# Patient Record
Sex: FEMALE | Race: WHITE
[De-identification: names, ages, dates, MRNs, and addresses within clinical notes are randomized per-mention and may not be internally consistent; named-entity substitution may affect disease eponyms.]

---

## 2017-02-24 ENCOUNTER — HOSPITAL ENCOUNTER (EMERGENCY)
Dept: HOSPITAL 25 - UCEAST | Age: 47
Discharge: HOME | End: 2017-02-24
Payer: COMMERCIAL

## 2017-02-24 VITALS — SYSTOLIC BLOOD PRESSURE: 113 MMHG | DIASTOLIC BLOOD PRESSURE: 73 MMHG

## 2017-02-24 DIAGNOSIS — R10.30: Primary | ICD-10-CM

## 2017-02-24 DIAGNOSIS — Z88.2: ICD-10-CM

## 2017-02-24 DIAGNOSIS — N94.89: ICD-10-CM

## 2017-02-24 DIAGNOSIS — Z88.0: ICD-10-CM

## 2017-02-24 DIAGNOSIS — F41.9: ICD-10-CM

## 2017-02-24 PROCEDURE — 74176 CT ABD & PELVIS W/O CONTRAST: CPT

## 2017-02-24 PROCEDURE — G0463 HOSPITAL OUTPT CLINIC VISIT: HCPCS

## 2017-02-24 PROCEDURE — 81002 URINALYSIS NONAUTO W/O SCOPE: CPT

## 2017-02-24 PROCEDURE — 99212 OFFICE O/P EST SF 10 MIN: CPT

## 2017-02-24 NOTE — UC
Abdominal Pain Female HPI





- HPI Summary


HPI Summary: 


WENT TO [facility] 3 DAYS AGO AND TX FOR UTI WITH MACROBID BID X 5 DAYS. IS 

FEELING WORSE. PERSISTENT SUPRAPUBIC PAIN GOING INTO GROIN AND FLANK (RIGHT>LEFT

), FEELS BLOATED AND NAUSEATED. NO FEVER. HAS URINARY FREQUENCY AND MILD 

DYSURIA.





- History of Current Complaint


Chief Complaint: UCAbdominalPain


Stated Complaint: ABD PAIN


Time Seen by Provider: 02/24/17 07:40


Hx Obtained From: Patient


Hx Last Menstrual Period: 2/2016 Uterine ablation 2/16


Onset/Duration: Gradual Onset, Lasting Days, Still Present


Timing: Constant


Severity Initially: Moderate


Severity Currently: Moderate


Pain Intensity: 6


Pain Scale Used: 0-10 Numeric


Location: Suprapubic


Radiates: Yes


Radiates to: Flank


Character: Burning, Sharp


Aggravating Factor(s): Nothing


Alleviating Factor(s): Nothing


Associated Signs and Symptoms: Positive: Back Pain, Urinary Symptoms, Nausea.  

Negative: Diaphoresis, Fever, Cough, Chest Pain, Dizzy, Constipation, Blood in 

Stool, Decreased Appetite, Vaginal Bleeding, Vaginal Discharge, Vomiting, 

Diarrhea


Allergies/Adverse Reactions: 


 Allergies











Allergy/AdvReac Type Severity Reaction Status Date / Time


 


Amoxicillin Allergy Severe Rash Verified 02/18/16 08:15


 


Penicillins Allergy Severe Shortness Verified 02/18/16 08:15





   of Breath  


 


Sulfa Antibiotics Allergy Severe Rash Verified 02/18/16 08:15











Home Medications: 


 Home Medications





Nitrofurantoin Monohyd Macro [Macrobid] 1 cap PO BID 02/24/17 [History 

Confirmed 02/24/17]











PMH/Surg Hx/FS Hx/Imm Hx


Psychological History Of: Reports: Anxiety - ON MEDS


Cancer History Of: 


   Denies: Breast Cancer





- Surgical History


Surgical History: Yes


Surgery Procedure, Year, and Place: GALLBLADDER, 17 YRS AGO, [facility].  TONSILECTOMY

, CHILD.  Uterine Ablation





- Family History


Known Family History: Positive: Diabetes


Family History: DIABETES - DAD, RHEUMATOID ARTHRITIS - MOM





- Social History


Alcohol Use: None


Substance Use Type: None


Smoking Status (MU): Never Smoked Tobacco


Have You Smoked in the Last Year: No





Review of Systems


Constitutional: Negative


Respiratory: Negative


Cardiovascular: Negative


Gastrointestinal: Abdominal Pain, Other - NAUSEA


Genitourinary: Dysuria, Frequency


All Other Systems Reviewed And Are Negative: Yes





Physical Exam


Triage Information Reviewed: Yes


Appearance: Well-Appearing, No Pain Distress, Well-Nourished


Vital Signs: 


 Initial Vital Signs











Temp  98.3 F   02/24/17 07:17


 


Pulse  68   02/24/17 07:17


 


Resp  16   02/24/17 07:17


 


BP  113/73   02/24/17 07:17


 


Pulse Ox  98   02/24/17 07:17











Vital Signs Reviewed: Yes


Eyes: Positive: Conjunctiva Clear


ENT: Positive: Hearing grossly normal


Neck: Positive: Supple


Respiratory: Positive: No respiratory distress, No accessory muscle use


Cardiovascular: Positive: Pulses Normal


Abdomen Description: Positive: Soft, CVA Tenderness (R) - EQUIVOCAL.  Negative: 

CVA Tenderness (L), Distended, Guarding


Musculoskeletal: Positive: No Edema


Neurological: Positive: Alert


Psychological: Positive: Age Appropriate Behavior


Skin: Negative: rashes





Diagnostics





- Laboratory


Diagnostic Studies Completed/Ordered: URINE DIP SP. GR. 1.015, 1+BLOOD





- Radiology


  ** CT ABD/PELVIS W/O CONTRAST


Xray Interpretation: Positive (See Comments) - 2.3 CM CYST OF THE RIGHT 

HEMIPELVIS


Radiology Interpretation Completed By: Radiologist





Abd Pain Female Course/Dx





- Differential Dx/Diagnosis


Provider Diagnoses: 1. ABDOMINAL PAIN, NOS.  2. PELVIC CYST





- Physician Notification/Consults


Discussed Patient Care With: DR. SHANE GAR (OB/GYN)


Time Discussed With Above Provider: 09:30 - ADVISED CHANGING ABX FOR UTI AND 

OFFICE F/U


Instructed by Provider To: Have Pt Call For Appt.





Discharge





- Discharge Plan


Condition: Stable


Disposition: HOME


Prescriptions: 


Ciprofloxacin TAB* [Cipro Tab*] 500 mg PO BID #10 tab


Ondansetron ODT TAB* [Zofran Odt TAB*] 4 mg PO Q6H PRN #20 tab.odt


 PRN Reason: Nausea/Vomiting


Patient Education Materials:  Abdominal Pain (ED)


Referrals: 


Pierce Lopez MD [Primary Care Provider] - If Needed


Shane Gar MD [Medical Doctor] - 1 Week


Additional Instructions: 


CT TODAY SHOWED A 2.3 CM CYST IN THE RIGHT HEMIPELVIS. THIS MAY OR MAY NOT BE 

THE SOURCE OF YOUR DISCOMFORT. I SPOKE WITH DR. GAR WHO RECOMMENDED CHANGING 

UP YOUR ABX. FOLLOW-UP WITH HIM IN HIS OFFICE. GO TO THE ER WITHOUT FAIL IF YOU 

DEVELOP WORSENING PAIN, FEVER, NAUSEA OR ANY OTHER CONCERNING SYMPTOMS.

## 2017-02-24 NOTE — RAD
CLINICAL HISTORY: Right flank pain, hematuria



COMPARISON: August 26, 2012



TECHNIQUE: Multiple contiguous axial CT scans were obtained of the abdomen and pelvis,

without intravenous contrast enhancement. Coronal and sagittal multiplanar reformations

are submitted for review.  Oral contrast was not administered.     



FINDINGS: 

The study is limited by the lack of intravenous contrast. This limits evaluation of the

solid organs and vasculature.





LUNG BASES: The lung bases are clear.



LIVER: The liver is normal in shape, size, contour, and attenuation.

BILE DUCTS: There is no intrahepatic or extrahepatic biliary dilatation.

GALLBLADDER: The gallbladder is not visualized. Surgical clips are noted in the

gallbladder fossa.



PANCREAS: The pancreas is normal, without mass or ductal dilatation.

SPLEEN: Normal in size and appearance.



UPPER GI TRACT: Evaluation of the gastrointestinal tract is limited by incomplete gastric

distention. The upper GI tract is unremarkable.

SMALL BOWEL AND MESENTERY: The small bowel is normal in contour, course, and caliber.

There is no obstruction or dilatation.

COLON: The colon is normal in contour, course, caliber. There is no pericolonic

inflammatory change. There is a tubular, vermiform, hollow viscus that is blind ending,

and originates from the cecum, consistent with a normal appendix. There is no

periappendiceal inflammatory change. This is best seen on images 114 through 118.



ADRENALS: Normal bilaterally.

KIDNEYS: The kidneys are normal in shape, size, contour, and axis. There is no

hydronephrosis or nephrolithiasis.

BLADDER: The bladder is smooth in contour.



PELVIC ORGANS: There is a 2.3 cm cyst of the right hemipelvis. The pelvic organs otherwise

normal for patient age and technique.



AORTA: The aorta is normal.

IVC: Unremarkable



LYMPH NODES: There is no lymphadenopathy by size criteria.



ABDOMINAL WALL: There is no evidence for abdominal wall hernia.

BONES AND SOFT TISSUES: There are mild diffuse degenerative changes.

OTHER: None



IMPRESSION:

NO HYDRONEPHROSIS OR NEPHROLITHIASIS.

2.3 CM CYST OF THE RIGHT HEMIPELVIS

## 2018-01-29 ENCOUNTER — HOSPITAL ENCOUNTER (EMERGENCY)
Dept: HOSPITAL 25 - UCCORT | Age: 48
Discharge: HOME | End: 2018-01-29
Payer: COMMERCIAL

## 2018-01-29 VITALS — DIASTOLIC BLOOD PRESSURE: 72 MMHG | SYSTOLIC BLOOD PRESSURE: 120 MMHG

## 2018-01-29 DIAGNOSIS — J11.1: Primary | ICD-10-CM

## 2018-01-29 DIAGNOSIS — Z88.0: ICD-10-CM

## 2018-01-29 DIAGNOSIS — F41.9: ICD-10-CM

## 2018-01-29 DIAGNOSIS — Z88.2: ICD-10-CM

## 2018-01-29 DIAGNOSIS — Z90.49: ICD-10-CM

## 2018-01-29 PROCEDURE — G0463 HOSPITAL OUTPT CLINIC VISIT: HCPCS

## 2018-01-29 PROCEDURE — 99212 OFFICE O/P EST SF 10 MIN: CPT

## 2018-01-29 NOTE — ED
Respiratory





- HPI Summary


HPI Summary: 





47 yr old with runny nose, sore throat, myalgias, chills and coughing, in the 

setting of influenza outbreak.  No SOB.  No CP. No other complaints. 





- History of Current Complaint


Chief Complaint: UCGeneralIllness


Stated Complaint: SORE THROAT,COUGH, BODY ACHES


Time Seen by Provider: 01/29/18 17:38


Pain Intensity: 5





- Allergy/Home Medications


Allergies/Adverse Reactions: 


 Allergies











Allergy/AdvReac Type Severity Reaction Status Date / Time


 


Amoxicillin Allergy Severe Rash Verified 01/29/18 17:26


 


Penicillins Allergy Severe Shortness Verified 01/29/18 17:26





   of Breath  


 


Sulfa Antibiotics Allergy Severe Rash Verified 01/29/18 17:26














PMH/Surg Hx/FS Hx/Imm Hx


Previously Healthy: Yes


Cardiovascular History: 


   Denies: Other Cardiovascular Problems/Disorders


Respiratory History: 


   Denies: Other Respiratory Problems/Disorders


GI History: 


   Denies: Other GI Disorders


Musculoskeletal History: 


   Denies: Other Musculoskeletal History


Sensory History: 


   Denies: Hx Contacts or Glasses, Hx Hearing Aid


Opthamlomology History: 


   Denies: Hx Contacts or Glasses


Neurological History: 


   Denies: Other Neuro Impairments/Disorders


Psychiatric History: Reports: Hx Anxiety - ON MEDS





- Cancer History


Hx Chemotherapy: No


Hx Radiation Therapy: No





- Surgical History


Surgery Procedure, Year, and Place: GALLBLADDER, 17 YRS AGO, [facility].  TONSILECTOMY

, CHILD.  Uterine Ablation


Hx Anesthesia Reactions: No


Infectious Disease History: No


Infectious Disease History: 


   Denies: History Other Infectious Disease, Traveled Outside the [place] in Last 30 

Days





- Family History


Known Family History: Positive: Diabetes


Family History: DIABETES - DAD, RHEUMATOID ARTHRITIS - MOM





- Social History


Alcohol Use: None


Substance Use Type: Reports: None


Smoking Status (MU): Never Smoked Tobacco


Have You Smoked in the Last Year: No





Review of Systems


Positive: Chills


Positive: Sore Throat, Nasal Discharge


Positive: Cough


All Other Systems Reviewed And Are Negative: Yes





Physical Exam


Triage Information Reviewed: Yes


Vital Signs On Initial Exam: 


 Initial Vitals











Temp Pulse Resp BP Pulse Ox


 


 98.4 F   72   16   120/72   99 


 


 01/29/18 17:20  01/29/18 17:20  01/29/18 17:20  01/29/18 17:20  01/29/18 17:20











Vital Signs Reviewed: Yes


Appearance: Positive: Well-Appearing, No Pain Distress


Skin: Positive: Warm, Skin Color Reflects Adequate Perfusion


Eyes: Positive: EOMI


ENT: Positive: Pharyngeal erythema, Nasal congestion, Nasal drainage, TMs 

normal.  Negative: Muffled voice, Hoarse voice


Neck: Positive: Supple, Nontender


Respiratory/Lung Sounds: Positive: Clear to Auscultation, Breath Sounds Present


Cardiovascular: Positive: RRR.  Negative: Murmur


Abdomen Description: Positive: Nontender


Musculoskeletal: Positive: Strength/ROM Intact


Neurological: Positive: Sensory/Motor Intact, Alert, Oriented to Person Place, 

Time, CN Intact II-III


Psychiatric: Positive: Normal





- Rolo Coma Scale


Best Eye Response: 4 - Spontaneous


Best Motor Response: 6 - Obeys Commands


Best Verbal Response: 5 - Oriented


Coma Scale Total: 15





Diagnostics





- Vital Signs


 Vital Signs











  Temp Pulse Resp BP Pulse Ox


 


 01/29/18 17:20  98.4 F  72  16  120/72  99














- Laboratory


Lab Statement: Any lab studies that have been ordered have been reviewed, and 

results considered in the medical decision making process.





Disposition





- Course


Course Of Treatment: 47 yr old with influenza symptoms.  DC home on tamiflu.





- Diagnoses


Provider Diagnoses: 


 Influenza








Discharge





- Discharge Plan


Condition: Good


Disposition: HOME


Prescriptions: 


Oseltamivir CAP* [Tamiflu CAP*] 75 mg PO BID #10 cap


Patient Education Materials:  Influenza (ED)


Forms:  *Work Release


Referrals: 


Pierce Lopez MD [Primary Care Provider] - 2 Days

## 2019-12-16 ENCOUNTER — HOSPITAL ENCOUNTER (EMERGENCY)
Dept: HOSPITAL 25 - UCCORT | Age: 49
Discharge: HOME | End: 2019-12-16
Payer: COMMERCIAL

## 2019-12-16 VITALS — SYSTOLIC BLOOD PRESSURE: 116 MMHG | DIASTOLIC BLOOD PRESSURE: 51 MMHG

## 2019-12-16 DIAGNOSIS — Z88.2: ICD-10-CM

## 2019-12-16 DIAGNOSIS — J06.9: Primary | ICD-10-CM

## 2019-12-16 DIAGNOSIS — Z79.899: ICD-10-CM

## 2019-12-16 DIAGNOSIS — E78.5: ICD-10-CM

## 2019-12-16 DIAGNOSIS — H92.09: ICD-10-CM

## 2019-12-16 DIAGNOSIS — Z88.0: ICD-10-CM

## 2019-12-16 PROCEDURE — G0463 HOSPITAL OUTPT CLINIC VISIT: HCPCS

## 2019-12-16 PROCEDURE — 99212 OFFICE O/P EST SF 10 MIN: CPT

## 2019-12-16 NOTE — UC
Throat Pain/Nasal Ghassan HPI





- HPI Summary


HPI Summary: 


49-year-old woman comes in with chief complaint of upper respiratory tract 

infection symptoms for 3 days.  She has yellow rhinorrhea postnasal drip sore 

throat cough chest congestion chills.  Been using over-the-counter medicines 

which helped the symptoms some.  Patient is not a smoker.





- History of Current Complaint


Chief Complaint: UCGeneralIllness


Stated Complaint: SINUS/COUGH


Time Seen by Provider: 12/16/19 07:27


Hx Last Menstrual Period: n/a - uterine ablation


Pain Intensity: 3





- Allergies/Home Medications


Allergies/Adverse Reactions: 


 Allergies











Allergy/AdvReac Type Severity Reaction Status Date / Time


 


amoxicillin Allergy  Rash Verified 12/16/19 07:20


 


Penicillins Allergy  Shortness Verified 12/16/19 07:20





   of Breath  


 


Sulfa (Sulfonamide Allergy  Rash Verified 12/16/19 07:20





Antibiotics)     











Home Medications: 


 Home Medications





Atorvastatin* [Lipitor*] 20 mg PO DAILY 12/16/19 [History Confirmed 12/16/19]


Dm/Acetaminophen/Doxylamine [Nighttime Cold and Flu Liquid] 30 ml PO ONCE 12/16/ 19 [History Confirmed 12/16/19]


guaiFENesin [Mucinex] 1 tab PO ONCE 12/16/19 [History Confirmed 12/16/19]











PMH/Surg Hx/FS Hx/Imm Hx


Previously Healthy: Yes


Endocrine History: Dyslipidemia





- Surgical History


Surgical History: Yes


Surgery Procedure, Year, and Place: GALLBLADDER, 17 YRS AGO, [facility].  TONSILECTOMY

, CHILD.  Uterine Ablation





- Family History


Known Family History: Positive: Diabetes


Family History: DIABETES - DAD, RHEUMATOID ARTHRITIS - MOM





- Social History


Alcohol Use: None


Substance Use Type: None


Smoking Status (MU): Never Smoked Tobacco


Have You Smoked in the Last Year: No





Review of Systems


All Other Systems Reviewed And Are Negative: Yes


Constitutional: Positive: Chills, Other - see hpi


Skin: Positive: Negative


Eyes: Positive: Negative


ENT: Positive: Sore Throat, Ear Ache, Nasal Discharge, Sinus Congestion, Sinus 

Pain/Tenderness


Respiratory: Positive: Cough, Other - see hpi


Cardiovascular: Positive: Negative


Gastrointestinal: Positive: Negative


Motor: Positive: Negative


Neurovascular: Positive: Negative


Musculoskeletal: Positive: Negative


Neurological: Positive: Negative


Psychological: Positive: Negative


Is Patient Immunocompromised?: No





Physical Exam


Triage Information Reviewed: Yes


Appearance: No Pain Distress, Well-Nourished, Ill-Appearing - mild


Vital Signs: 


 Initial Vital Signs











Temp  99.6 F   12/16/19 07:21


 


Pulse  81   12/16/19 07:21


 


Resp  14   12/16/19 07:21


 


BP  116/51   12/16/19 07:21


 


Pulse Ox  97   12/16/19 07:21











Vital Signs Reviewed: Yes


Eye Exam: Normal


Eyes: Positive: Conjunctiva Clear


ENT: Positive: Pharyngeal erythema, Nasal congestion, Nasal drainage, TMs normal


Neck: Positive: Supple


Respiratory: Positive: Lungs clear, Normal breath sounds, No respiratory 

distress


Cardiovascular: Positive: RRR


Musculoskeletal: Positive: Strength Intact, ROM Intact


Neurological: Positive: Alert, Muscle Tone Normal


Psychological: Positive: Age Appropriate Behavior


Skin Exam: Normal





Throat Pain/Nasal Course/Dx





- Course


Course Of Treatment: 





DISCUSSED VIRAL VERSES BACTERIAL INFECTIONS AND THE ROLE OF ANTIBIOTICS.


THE PATIENT PREFERS TO BE ON ANTIBIOTICS AT THIS TIME.











- Differential Dx/Diagnosis


Provider Diagnosis: 


 Upper respiratory infection








Discharge ED





- Sign-Out/Discharge


Documenting (check all that apply): Patient Departure


All imaging exams completed and their final reports reviewed: No Studies





- Discharge Plan


Condition: Stable


Disposition: HOME


Prescriptions: 


Azithromyxin JAMES (NF) [Z-James (Zithromax) 250 mg tabs #6] 2 tab PO .TODAY, THEN 

1 DAILY #6 tab


Patient Education Materials:  Upper Respiratory Infection (ED)


Referrals: 


Pierce Lopez MD [Primary Care Provider] - 


Additional Instructions: 


FOLLOW UP WITH YOUR DOCTOR IF NOT COMPLETELY IMPROVED.


GET REEVALUATED SOONER IF NOT IMPROVING OR WORSE OR ANY QUESTIONS OR CONCERNS.








- Billing Disposition and Condition


Condition: STABLE


Disposition: Home